# Patient Record
Sex: FEMALE | URBAN - METROPOLITAN AREA
[De-identification: names, ages, dates, MRNs, and addresses within clinical notes are randomized per-mention and may not be internally consistent; named-entity substitution may affect disease eponyms.]

---

## 2017-11-30 ENCOUNTER — HISTORICAL (OUTPATIENT)
Dept: ADMINISTRATIVE | Facility: HOSPITAL | Age: 27
End: 2017-11-30

## 2017-12-11 ENCOUNTER — HISTORICAL (OUTPATIENT)
Dept: RADIOLOGY | Facility: HOSPITAL | Age: 27
End: 2017-12-11

## 2017-12-19 ENCOUNTER — HISTORICAL (OUTPATIENT)
Dept: SURGERY | Facility: HOSPITAL | Age: 27
End: 2017-12-19

## 2017-12-19 LAB — POC BETA-HCG (QUAL): NEGATIVE

## 2018-11-30 ENCOUNTER — HISTORICAL (OUTPATIENT)
Dept: ADMINISTRATIVE | Facility: HOSPITAL | Age: 28
End: 2018-11-30

## 2018-11-30 LAB
GLUCOSE 1H P 100 G GLC PO SERPL-MCNC: 175 MG/DL (ref 100–180)
GLUCOSE 2H P 100 G GLC PO SERPL-MCNC: 173 MG/DL (ref 70–140)
GLUCOSE 3H P 100 G GLC PO SERPL-MCNC: 148 MG/DL (ref 70–115)
GLUCOSE BS SERPL-MCNC: 73 MG/DL (ref 70–115)

## 2022-04-10 ENCOUNTER — HISTORICAL (OUTPATIENT)
Dept: ADMINISTRATIVE | Facility: HOSPITAL | Age: 32
End: 2022-04-10

## 2022-04-25 VITALS
OXYGEN SATURATION: 99 % | HEIGHT: 62 IN | SYSTOLIC BLOOD PRESSURE: 94 MMHG | WEIGHT: 125 LBS | BODY MASS INDEX: 23 KG/M2 | DIASTOLIC BLOOD PRESSURE: 60 MMHG

## 2022-04-30 NOTE — OP NOTE
Patient:   Verónica Bunch            MRN: [id number]            FIN: [id number]               Age:   27 years     Sex:  Female     :  1990   Associated Diagnoses:   None   Author:   Abner EMANUEL MD, Delon KATZ      SURGEON: Delon Levine MD    PREOPERATIVE DIAGNOSIS:   Right knee medial and lateral meniscus tear    POSTOPERATIVE DIAGNOSIS:   Right knee medial and lateral meniscus tear    PROCEDURE PERFORMED:  1. Right knee arthroscopic medial and lateral meniscus repair       ESTIMATED BLOOD LOSS: 10cc.    COMPLICATIONS: None.    TOURNIQUET TIME: 50 minutes.    ANTIBIOTICS:Ancef     INDICATIONS FOR PROCEDURE: Verónica is a 27 y.o. female who has had ongoing right knee pain. The patient has had to limit activity due to intermittent pain & occasional mechanical symptoms. An MRI was performed that showed meniscus tears that I felt would be amenable to arthroscopic repair. The patient decided to opt for surgery after failing conservative management.    OPERATIVE REPORT: Verónica was initially seen in the preoperative holding area where history and physical were reviewed without change. The operative leg was marked, consents were reviewed, and any questions were answered for the patient and family.The patient was then taken back to the operating room, placed supine on the operating table with all bony prominences padded. Then a nonsterile tourniquet was placed around the upper thigh. The patient was induced under general anesthesia.The operative lower extremity was prepped and draped in standard sterile fashion. A timeout led by the surgeon was performed, and preoperative antibiotics were given. The limp was exsanguinated with gravity. The tourniquet was raised to 100 mmHg over the systolic blood pressure.    The knee was then flexed and the inferolateral portal was created with an #11 blade scalpel.    The camera was introduced, using the blunt trocar, into the suprapatellar pouch. The undersurface of the patella was  found to have no chondral wear and the trochlear groove was found to have no chondral wear . The camera was then taken down into the lateral gutter, where no loose bodies and no plica were found. The camera was then brought into the medial gutter, where no loose bodies and no plica were seen. The camera was then brought into the medial compartment.    An inferomedial portal was then localized with a spinal needle, and created using an #11 blade. The medial meniscus was probed and found to have a vertical tear of the posterior body. . I used a rasp on  this area to stimulate healing.  I then use a [employer] FasT-Fix device to secure the tear..The medial femoral condyle was found to have no wear. The medial tibial plateau demonstrated no wear.    The camera was then turned to the notch, where the ACL was found to be intact.    Then the leg was brought into figure-of-four position. The camera was brought into the lateral compartment. The lateral meniscus was probed and found to be torn and a bucket-handle fashion from the posterior root past the popliteus to the mid body.  I was able to reduce back the meniscus using a probe. I used a rasp to stimulate the tear.  I then secured it initially with a vertical mattress suture anterior to the popliteus.  I then placed a second suture also anterior to the popliteus.  A third and fourth suture were placed superior and inferior along the meniscus both posterior to the popliteus.  The sutures were passed using the [employer] FasT-Fix.. The lateral femoral condyle was found to have no chondral wear. The lateral tibial plateau had no chondral wear.    The knee was once more examined for loose bodies, of which none were found. The knee was then evacuated of all fluids. The portals were closed with 3-0 monocyrl interrupted sutures and steristrips. 10mL of 0.25% Bupivicaine were infiltrated around each portal. A sterile dressing was placed, and the tourniquet was deflated  after 50 minutes. The patient was awakened from anesthesia and taken to the postoperative care unit in stable condition.

## 2025-06-26 ENCOUNTER — LAB VISIT (OUTPATIENT)
Dept: LAB | Facility: HOSPITAL | Age: 35
End: 2025-06-26
Payer: COMMERCIAL

## 2025-06-26 DIAGNOSIS — O99.810 ABNORMAL MATERNAL GLUCOSE TOLERANCE, ANTEPARTUM: Primary | ICD-10-CM

## 2025-06-26 LAB
GLUCOSE 1H P 100 G GLC PO SERPL-MCNC: 153 MG/DL (ref 100–180)
GLUCOSE 2H P 100 G GLC PO SERPL-MCNC: 136 MG/DL (ref 70–140)
GLUCOSE 3H P 100 G GLC PO SERPL-MCNC: 111 MG/DL (ref 70–115)
GLUCOSE P FAST SERPL-MCNC: 81 MG/DL (ref 70–100)
PATH REV: NORMAL
POCT GLUCOSE: 81 MG/DL (ref 70–110)

## 2025-06-26 PROCEDURE — 82952 GTT-ADDED SAMPLES: CPT

## 2025-06-26 PROCEDURE — 82947 ASSAY GLUCOSE BLOOD QUANT: CPT

## 2025-06-26 PROCEDURE — 82950 GLUCOSE TEST: CPT

## 2025-06-26 PROCEDURE — 82951 GLUCOSE TOLERANCE TEST (GTT): CPT

## 2025-06-26 PROCEDURE — 36415 COLL VENOUS BLD VENIPUNCTURE: CPT
